# Patient Record
Sex: FEMALE | Race: WHITE | NOT HISPANIC OR LATINO | URBAN - METROPOLITAN AREA
[De-identification: names, ages, dates, MRNs, and addresses within clinical notes are randomized per-mention and may not be internally consistent; named-entity substitution may affect disease eponyms.]

---

## 2020-05-20 ENCOUNTER — INPATIENT (INPATIENT)
Facility: HOSPITAL | Age: 35
LOS: 2 days | Discharge: HOME | End: 2020-05-23
Attending: OBSTETRICS & GYNECOLOGY | Admitting: OBSTETRICS & GYNECOLOGY
Payer: COMMERCIAL

## 2020-05-20 VITALS
OXYGEN SATURATION: 97 % | SYSTOLIC BLOOD PRESSURE: 127 MMHG | RESPIRATION RATE: 18 BRPM | TEMPERATURE: 98 F | HEART RATE: 85 BPM | DIASTOLIC BLOOD PRESSURE: 70 MMHG

## 2020-05-20 DIAGNOSIS — O26.899 OTHER SPECIFIED PREGNANCY RELATED CONDITIONS, UNSPECIFIED TRIMESTER: ICD-10-CM

## 2020-05-20 DIAGNOSIS — Z3A.00 WEEKS OF GESTATION OF PREGNANCY NOT SPECIFIED: ICD-10-CM

## 2020-05-20 DIAGNOSIS — Z34.80 ENCOUNTER FOR SUPERVISION OF OTHER NORMAL PREGNANCY, UNSPECIFIED TRIMESTER: ICD-10-CM

## 2020-05-20 LAB
BASOPHILS # BLD AUTO: 0.01 K/UL — SIGNIFICANT CHANGE UP (ref 0–0.2)
BASOPHILS NFR BLD AUTO: 0.1 % — SIGNIFICANT CHANGE UP (ref 0–2)
BLD GP AB SCN SERPL QL: NEGATIVE — SIGNIFICANT CHANGE UP
EOSINOPHIL # BLD AUTO: 0.06 K/UL — SIGNIFICANT CHANGE UP (ref 0–0.5)
EOSINOPHIL NFR BLD AUTO: 0.6 % — SIGNIFICANT CHANGE UP (ref 0–6)
HCT VFR BLD CALC: 40.1 % — SIGNIFICANT CHANGE UP (ref 34.5–45)
HGB BLD-MCNC: 13.1 G/DL — SIGNIFICANT CHANGE UP (ref 11.5–15.5)
IMM GRANULOCYTES NFR BLD AUTO: 1 % — SIGNIFICANT CHANGE UP (ref 0–1.5)
LYMPHOCYTES # BLD AUTO: 1.33 K/UL — SIGNIFICANT CHANGE UP (ref 1–3.3)
LYMPHOCYTES # BLD AUTO: 13.5 % — SIGNIFICANT CHANGE UP (ref 13–44)
MCHC RBC-ENTMCNC: 27.6 PG — SIGNIFICANT CHANGE UP (ref 27–34)
MCHC RBC-ENTMCNC: 32.7 GM/DL — SIGNIFICANT CHANGE UP (ref 32–36)
MCV RBC AUTO: 84.6 FL — SIGNIFICANT CHANGE UP (ref 80–100)
MONOCYTES # BLD AUTO: 0.68 K/UL — SIGNIFICANT CHANGE UP (ref 0–0.9)
MONOCYTES NFR BLD AUTO: 6.9 % — SIGNIFICANT CHANGE UP (ref 2–14)
NEUTROPHILS # BLD AUTO: 7.68 K/UL — HIGH (ref 1.8–7.4)
NEUTROPHILS NFR BLD AUTO: 77.9 % — HIGH (ref 43–77)
NRBC # BLD: 0 /100 WBCS — SIGNIFICANT CHANGE UP (ref 0–0)
PLATELET # BLD AUTO: 197 K/UL — SIGNIFICANT CHANGE UP (ref 150–400)
RBC # BLD: 4.74 M/UL — SIGNIFICANT CHANGE UP (ref 3.8–5.2)
RBC # FLD: 14.5 % — SIGNIFICANT CHANGE UP (ref 10.3–14.5)
RH IG SCN BLD-IMP: POSITIVE — SIGNIFICANT CHANGE UP
SARS-COV-2 RNA SPEC QL NAA+PROBE: SIGNIFICANT CHANGE UP
T PALLIDUM AB TITR SER: NEGATIVE — SIGNIFICANT CHANGE UP
WBC # BLD: 9.86 K/UL — SIGNIFICANT CHANGE UP (ref 3.8–10.5)
WBC # FLD AUTO: 9.86 K/UL — SIGNIFICANT CHANGE UP (ref 3.8–10.5)

## 2020-05-20 RX ORDER — OXYTOCIN 10 UNIT/ML
333.33 VIAL (ML) INJECTION
Qty: 20 | Refills: 0 | Status: DISCONTINUED | OUTPATIENT
Start: 2020-05-20 | End: 2020-05-23

## 2020-05-20 RX ORDER — SODIUM CHLORIDE 9 MG/ML
3 INJECTION INTRAMUSCULAR; INTRAVENOUS; SUBCUTANEOUS EVERY 8 HOURS
Refills: 0 | Status: DISCONTINUED | OUTPATIENT
Start: 2020-05-20 | End: 2020-05-23

## 2020-05-20 RX ORDER — AER TRAVELER 0.5 G/1
1 SOLUTION RECTAL; TOPICAL EVERY 4 HOURS
Refills: 0 | Status: DISCONTINUED | OUTPATIENT
Start: 2020-05-20 | End: 2020-05-23

## 2020-05-20 RX ORDER — OXYTOCIN 10 UNIT/ML
4 VIAL (ML) INJECTION
Qty: 30 | Refills: 0 | Status: DISCONTINUED | OUTPATIENT
Start: 2020-05-20 | End: 2020-05-23

## 2020-05-20 RX ORDER — DIPHENHYDRAMINE HCL 50 MG
25 CAPSULE ORAL EVERY 6 HOURS
Refills: 0 | Status: DISCONTINUED | OUTPATIENT
Start: 2020-05-20 | End: 2020-05-23

## 2020-05-20 RX ORDER — OXYCODONE HYDROCHLORIDE 5 MG/1
5 TABLET ORAL
Refills: 0 | Status: DISCONTINUED | OUTPATIENT
Start: 2020-05-20 | End: 2020-05-23

## 2020-05-20 RX ORDER — LANOLIN
1 OINTMENT (GRAM) TOPICAL EVERY 6 HOURS
Refills: 0 | Status: DISCONTINUED | OUTPATIENT
Start: 2020-05-20 | End: 2020-05-23

## 2020-05-20 RX ORDER — SODIUM CHLORIDE 9 MG/ML
1000 INJECTION, SOLUTION INTRAVENOUS
Refills: 0 | Status: DISCONTINUED | OUTPATIENT
Start: 2020-05-20 | End: 2020-05-20

## 2020-05-20 RX ORDER — PRAMOXINE HYDROCHLORIDE 150 MG/15G
1 AEROSOL, FOAM RECTAL EVERY 4 HOURS
Refills: 0 | Status: DISCONTINUED | OUTPATIENT
Start: 2020-05-20 | End: 2020-05-23

## 2020-05-20 RX ORDER — KETOROLAC TROMETHAMINE 30 MG/ML
30 SYRINGE (ML) INJECTION ONCE
Refills: 0 | Status: DISCONTINUED | OUTPATIENT
Start: 2020-05-20 | End: 2020-05-20

## 2020-05-20 RX ORDER — OXYCODONE HYDROCHLORIDE 5 MG/1
5 TABLET ORAL ONCE
Refills: 0 | Status: DISCONTINUED | OUTPATIENT
Start: 2020-05-20 | End: 2020-05-23

## 2020-05-20 RX ORDER — IBUPROFEN 200 MG
600 TABLET ORAL EVERY 6 HOURS
Refills: 0 | Status: COMPLETED | OUTPATIENT
Start: 2020-05-20 | End: 2021-04-18

## 2020-05-20 RX ORDER — DIBUCAINE 1 %
1 OINTMENT (GRAM) RECTAL EVERY 6 HOURS
Refills: 0 | Status: DISCONTINUED | OUTPATIENT
Start: 2020-05-20 | End: 2020-05-23

## 2020-05-20 RX ORDER — CITRIC ACID/SODIUM CITRATE 300-500 MG
15 SOLUTION, ORAL ORAL EVERY 6 HOURS
Refills: 0 | Status: DISCONTINUED | OUTPATIENT
Start: 2020-05-20 | End: 2020-05-20

## 2020-05-20 RX ORDER — BENZOCAINE 10 %
1 GEL (GRAM) MUCOUS MEMBRANE EVERY 6 HOURS
Refills: 0 | Status: DISCONTINUED | OUTPATIENT
Start: 2020-05-20 | End: 2020-05-23

## 2020-05-20 RX ORDER — SIMETHICONE 80 MG/1
80 TABLET, CHEWABLE ORAL EVERY 4 HOURS
Refills: 0 | Status: DISCONTINUED | OUTPATIENT
Start: 2020-05-20 | End: 2020-05-23

## 2020-05-20 RX ORDER — HYDROCORTISONE 1 %
1 OINTMENT (GRAM) TOPICAL EVERY 6 HOURS
Refills: 0 | Status: DISCONTINUED | OUTPATIENT
Start: 2020-05-20 | End: 2020-05-23

## 2020-05-20 RX ORDER — IBUPROFEN 200 MG
600 TABLET ORAL EVERY 6 HOURS
Refills: 0 | Status: DISCONTINUED | OUTPATIENT
Start: 2020-05-20 | End: 2020-05-23

## 2020-05-20 RX ORDER — ACETAMINOPHEN 500 MG
975 TABLET ORAL ONCE
Refills: 0 | Status: COMPLETED | OUTPATIENT
Start: 2020-05-20 | End: 2020-05-20

## 2020-05-20 RX ORDER — TETANUS TOXOID, REDUCED DIPHTHERIA TOXOID AND ACELLULAR PERTUSSIS VACCINE, ADSORBED 5; 2.5; 8; 8; 2.5 [IU]/.5ML; [IU]/.5ML; UG/.5ML; UG/.5ML; UG/.5ML
0.5 SUSPENSION INTRAMUSCULAR ONCE
Refills: 0 | Status: DISCONTINUED | OUTPATIENT
Start: 2020-05-20 | End: 2020-05-23

## 2020-05-20 RX ORDER — MAGNESIUM HYDROXIDE 400 MG/1
30 TABLET, CHEWABLE ORAL
Refills: 0 | Status: DISCONTINUED | OUTPATIENT
Start: 2020-05-20 | End: 2020-05-23

## 2020-05-20 RX ORDER — ACETAMINOPHEN 500 MG
975 TABLET ORAL
Refills: 0 | Status: DISCONTINUED | OUTPATIENT
Start: 2020-05-20 | End: 2020-05-23

## 2020-05-20 RX ADMIN — Medication 975 MILLIGRAM(S): at 22:22

## 2020-05-20 RX ADMIN — Medication 4 MILLIUNIT(S)/MIN: at 16:35

## 2020-05-20 RX ADMIN — SODIUM CHLORIDE 125 MILLILITER(S): 9 INJECTION, SOLUTION INTRAVENOUS at 16:00

## 2020-05-20 RX ADMIN — Medication 30 MILLIGRAM(S): at 19:41

## 2020-05-20 RX ADMIN — Medication 1000 MILLIUNIT(S)/MIN: at 18:03

## 2020-05-20 RX ADMIN — Medication 975 MILLIGRAM(S): at 17:20

## 2020-05-20 NOTE — PRE-ANESTHESIA EVALUATION ADULT - NSANTHPEFT_GEN_ALL_CORE
General: well appearing female, appears stated age  Cardiovascular: RRR, no murmurs appreciated  Respiratory: CTA B/L  regional site WNL

## 2020-05-20 NOTE — PRE-ANESTHESIA EVALUATION ADULT - NSANTHADDINFOFT_GEN_ALL_CORE
labor epidural explained to pt in detail, including risks not limited to HA, failure, nv injury.  All questions answered.

## 2020-05-21 ENCOUNTER — TRANSCRIPTION ENCOUNTER (OUTPATIENT)
Age: 35
End: 2020-05-21

## 2020-05-21 RX ORDER — IBUPROFEN 200 MG
1 TABLET ORAL
Qty: 0 | Refills: 0 | DISCHARGE
Start: 2020-05-21

## 2020-05-21 RX ORDER — ACETAMINOPHEN 500 MG
3 TABLET ORAL
Qty: 0 | Refills: 0 | DISCHARGE
Start: 2020-05-21

## 2020-05-21 RX ADMIN — Medication 975 MILLIGRAM(S): at 23:11

## 2020-05-21 RX ADMIN — Medication 975 MILLIGRAM(S): at 10:36

## 2020-05-21 RX ADMIN — Medication 600 MILLIGRAM(S): at 00:49

## 2020-05-21 RX ADMIN — Medication 975 MILLIGRAM(S): at 04:51

## 2020-05-21 RX ADMIN — Medication 600 MILLIGRAM(S): at 20:10

## 2020-05-21 RX ADMIN — Medication 600 MILLIGRAM(S): at 06:58

## 2020-05-21 RX ADMIN — Medication 600 MILLIGRAM(S): at 12:17

## 2020-05-21 RX ADMIN — Medication 975 MILLIGRAM(S): at 17:12

## 2020-05-21 RX ADMIN — Medication 1 TABLET(S): at 12:18

## 2020-05-21 NOTE — DISCHARGE NOTE OB - PATIENT PORTAL LINK FT
You can access the FollowMyHealth Patient Portal offered by [facility] by registering at the following website: [email or web address]. By joining [employer]’s FollowMyHealth portal, you will also be able to view your health information using other applications (apps) compatible with our system.

## 2020-05-21 NOTE — PROGRESS NOTE ADULT - SUBJECTIVE AND OBJECTIVE BOX
pt. s/p  w/ lumbar epidural for pain management.  Difficult placement in sitting position (3 attempts, no heme or CSF noted via Tuohy needle), pt. repositioned lateral decubitus position w/ subsequent easy uneventful placement.  Epidural analgesia effective throughout labor.  Pt. c/o today of mild posterior neck discomfort, occasionally radiating inferiorly to lumbar region, precipitated by movement.  Notes symptoms are aggravated more when standing vs. sitting/supine.  Denies headache, photophobia, weakness, numbness, parasthesias.    Pt's. lumbar spine w/ punctures noted in L2-L4 region, w/o erythema, swelling. Pt. reports mild tenderness to palpation.    Due to difficult placement, pt's symptoms could be due to spinal fluid leak across dural membrane although no obvious evidence of puncture was noted.  Pt's symptoms may also be related to general musculoskeletal stress during 2nd stage of labor.  Pt. advised that the symptoms should improve regardless of etiology, and have encouraged conservative measures as utilized in [facility] including bedrest, PO fluids, mild analgesics, and caffeine.  Although risks/benefits of an epidural blood patch was discussed with pt., it is not indicated at this time.  Pt. encourage to contact dep't of anesthesia directly or through obstetrician's office as needed.

## 2020-05-21 NOTE — DISCHARGE NOTE OB - MEDICATION SUMMARY - MEDICATIONS TO TAKE
I will START or STAY ON the medications listed below when I get home from the hospital:    acetaminophen 325 mg oral tablet  -- 3 tab(s) by mouth   -- Indication: For Vaginal delivery, pain control    ibuprofen 600 mg oral tablet  -- 1 tab(s) by mouth every 6 hours  -- Indication: For Vaginal delivery, pain control    Prenatal Multivitamins with Folic Acid 1 mg oral tablet  -- 1 tab(s) by mouth once a day  -- Indication: For Vitamin

## 2020-05-21 NOTE — PROGRESS NOTE ADULT - SUBJECTIVE AND OBJECTIVE BOX
S: Patient doing well. Minimal lochia. Pain controlled.    O: Vital Signs Last 24 Hrs  T(C): 36.6 (21 May 2020 08:08), Max: 37 (20 May 2020 20:30)  T(F): 97.9 (21 May 2020 08:08), Max: 98.6 (20 May 2020 20:30)  HR: 81 (21 May 2020 08:08) (80 - 90)  BP: 120/78 (21 May 2020 08:08) (112/69 - 131/75)  BP(mean): 95 (20 May 2020 20:00) (89 - 95)  RR: 18 (21 May 2020 08:08) (17 - 18)  SpO2: 99% (21 May 2020 08:08) (97% - 99%)    Gen: NAD  Abd: soft, NT, ND, fundus firm below umbilicus  Lochia: moderate  Ext: no tenderness    Labs:                        13.1   9.86  )-----------( 197      ( 20 May 2020 13:35 )             40.1       A: 35y PPD#1 s/p , likely spinal headache - mild rec. increased fluids    Plan:  regular diet  pain rx upon request  mild spinal headache - increase po and take tylenol and motrin - will see how she does in pm - if feeling well consider discharge otherwise to stay till ppd2  routine pp care  nothing per vagina  jkmr

## 2020-05-21 NOTE — DISCHARGE NOTE OB - MATERIALS PROVIDED
Breastfeeding Log/Breastfeeding Mother’s Support Group Information/Guide to Postpartum Care/Birth Certificate Instructions/Discharge Medication Information for Patients and Families Pocket Guide/[facility]  Screening Program/Shaken Baby Prevention Handout/Breastfeeding Guide and Packet/[facility] Hearing Screen Program/Back To Sleep Handout

## 2020-05-21 NOTE — DISCHARGE NOTE OB - HOSPITAL COURSE
Labor at term. Normal spontaneous vaginal delivery. Uncomplicated hospital course. On day of discharge, patient was ambulating, tolerating PO, voiding spontaneously and pain was well controlled. Vitals stable. Patient to follow up with OB in 6 weeks.

## 2020-05-21 NOTE — DISCHARGE NOTE OB - CARE PLAN
Principal Discharge DX:	Normal spontaneous vaginal delivery  Goal:	Return to activities of daily living  Assessment and plan of treatment:	Please call office for follow-up appointment. Regular diet. Activity as tolerated.

## 2020-05-21 NOTE — DISCHARGE NOTE OB - CARE PROVIDER_API CALL
Myesha Dillard  OBSTETRICS AND GYNECOLOGY  [address]  [place] [address]  Phone: [phone]  Fax: [phone]  Follow Up Time:

## 2020-05-22 RX ADMIN — Medication 975 MILLIGRAM(S): at 18:04

## 2020-05-22 RX ADMIN — Medication 975 MILLIGRAM(S): at 12:27

## 2020-05-22 RX ADMIN — Medication 600 MILLIGRAM(S): at 20:40

## 2020-05-22 RX ADMIN — Medication 600 MILLIGRAM(S): at 09:33

## 2020-05-22 RX ADMIN — Medication 975 MILLIGRAM(S): at 05:53

## 2020-05-22 RX ADMIN — Medication 975 MILLIGRAM(S): at 23:33

## 2020-05-22 RX ADMIN — Medication 600 MILLIGRAM(S): at 15:08

## 2020-05-22 RX ADMIN — Medication 1 TABLET(S): at 12:27

## 2020-05-22 NOTE — PROVIDER CONTACT NOTE (OTHER) - RECOMMENDATIONS
Gabriela frank will pass information on to the next shift for Pt to be seen and the team is handling over at this time.

## 2020-05-22 NOTE — PROGRESS NOTE ADULT - SUBJECTIVE AND OBJECTIVE BOX
S: Patient doing well. Minimal lochia. Pain controlled.    O: Vital Signs Last 24 Hrs  T(C): 36.5 (22 May 2020 06:23), Max: 36.6 (21 May 2020 12:50)  T(F): 97.7 (22 May 2020 06:23), Max: 97.9 (21 May 2020 12:50)  HR: 101 (22 May 2020 06:23) (77 - 101)  BP: 101/69 (22 May 2020 06:23) (101/69 - 121/80)  BP(mean): --  RR: 18 (22 May 2020 06:23) (18 - 18)  SpO2: 99% (21 May 2020 12:50) (99% - 99%)    Gen: NAD  Abd: soft, NT, ND, fundus firm below umbilicus  Lochia: moderate  Ext: no tenderness    Labs:                        13.1   9.86  )-----------( 197      ( 20 May 2020 13:35 )             40.1       A: 35y PPD#2 s/p  doing well.    Plan:  regular diet  pain rx upon request  routine pp care  nothing per vagina  hkp

## 2020-05-22 NOTE — PROGRESS NOTE ADULT - SUBJECTIVE AND OBJECTIVE BOX
Anesthesia called to bedside for evaluation of headache.  Pt. s/p  w/ lumbar epidural for pain management.  Difficult epidural placement noted (3 unsuccessful attempts in sitting position w/ successful placement in lateral decubitus position). Epidural analgesia effective throughout labor.  However, in the postpartum period she has experienced headache and posterior neck pain, occasionally radiating inferiorly to lumbar region, precipitated by movement.  Symptoms are worse when standing vs. sitting/supine.      Risks/benefits of an epidural blood patch was previously discussed with pt.  Due to persistent nature of symptoms and level of discomfort, patient request EBP at this time.    Patient was placed in sitting position and prepped in sterile fashion.  Difficult access to epidural space noted; 3 attempts made.  Epidural space identified via AFSANEH to air.  18 cc of autologous drawn from patient under sterile condition and injected in to epidural space via Touhy needle.  Patient was placed in supine position and reassessed after 1 hour.  Notable improvement in symptoms noted.  Patient cleared from anesthesia standpoint.

## 2020-05-23 VITALS
RESPIRATION RATE: 18 BRPM | OXYGEN SATURATION: 99 % | HEART RATE: 89 BPM | SYSTOLIC BLOOD PRESSURE: 120 MMHG | DIASTOLIC BLOOD PRESSURE: 79 MMHG | TEMPERATURE: 98 F

## 2020-05-23 PROCEDURE — 85027 COMPLETE CBC AUTOMATED: CPT

## 2020-05-23 PROCEDURE — 86850 RBC ANTIBODY SCREEN: CPT

## 2020-05-23 PROCEDURE — 59025 FETAL NON-STRESS TEST: CPT

## 2020-05-23 PROCEDURE — 86901 BLOOD TYPING SEROLOGIC RH(D): CPT

## 2020-05-23 PROCEDURE — 86780 TREPONEMA PALLIDUM: CPT

## 2020-05-23 PROCEDURE — 86900 BLOOD TYPING SEROLOGIC ABO: CPT

## 2020-05-23 PROCEDURE — G0463: CPT

## 2020-05-23 PROCEDURE — 59050 FETAL MONITOR W/REPORT: CPT

## 2020-05-23 RX ADMIN — Medication 975 MILLIGRAM(S): at 05:22

## 2020-05-23 RX ADMIN — Medication 600 MILLIGRAM(S): at 02:36

## 2020-05-23 RX ADMIN — Medication 1 TABLET(S): at 13:14

## 2020-05-23 RX ADMIN — Medication 975 MILLIGRAM(S): at 13:14

## 2020-05-23 RX ADMIN — Medication 600 MILLIGRAM(S): at 09:36

## 2020-05-23 NOTE — PROGRESS NOTE ADULT - SUBJECTIVE AND OBJECTIVE BOX
PPD#3    S: Patient doing well. Minimal lochia. Pain controlled.    O: Vital Signs Last 24 Hrs  T(C): 36.5 (23 May 2020 05:23), Max: 36.9 (22 May 2020 22:14)  T(F): 97.7 (23 May 2020 05:23), Max: 98.4 (22 May 2020 22:14)  HR: 69 (23 May 2020 05:23) (69 - 85)  BP: 122/81 (23 May 2020 05:23) (122/81 - 136/89)  BP(mean): --  RR: 18 (23 May 2020 05:23) (18 - 18)  SpO2: 99% (23 May 2020 05:23) (98% - 100%)    Gen: NAD  Abd: soft, NT, ND, fundus firm below umbilicus  Lochia: moderate  Ext: no tenderness    Labs:      A: 35y PPD# s/p  doing well.    Plan:  Analgesia prn  Regular diet  Discharge instruction given

## 2022-11-29 ENCOUNTER — NON-APPOINTMENT (OUTPATIENT)
Age: 37
End: 2022-11-29

## 2024-02-04 ENCOUNTER — NON-APPOINTMENT (OUTPATIENT)
Age: 39
End: 2024-02-04